# Patient Record
(demographics unavailable — no encounter records)

---

## 2025-01-02 NOTE — HISTORY OF PRESENT ILLNESS
[FreeTextEntry1] : Mr. Aleksander Wood is a 33 year  male w/ hx of HLD (not on any meds). Presents with c/o infertility. He and his partner have tried to conceive for about 1-2 years. He has no issues with erections. He reports no problems with his ejaculatory function. He is able to ejaculate intravaginally.  He reports that he was being followed by Dr. Jaime at [facility] for his infertility and was being managed on Clomid every other day, unclear what dose.  He had a semen analysis performed by his partners BUD specialist and was told he had low counts but he does not recall any specific numbers and did not provide any reports. No hx of anabolic steroid use, chemotherapy, testosterone replacement, prior trauma to genitalia, or chromosomal/genetic mutations. No known family hx of infertility.   His current partner is 31 years old.  She reportedly has no fertility issues and has been cleared by an BUD specialist.   Labs 9/2024 (on Clomid) - He reports that he has since stopped taking Clomid around October.  Total testosterone 491 Estradiol 52 elevated LH 5.1 FSH 11.2

## 2025-01-02 NOTE — PLAN

## 2025-01-02 NOTE — ASSESSMENT
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   Infertility can be due to female or male factor.  Female factors are menopause, early ovarian failure, irregular menses, PCOS, anatomic abnormalities of the vagina and uterus.  With age and especially after the age of 35 there is a higher chance of spontaneous pregnancy abortions and higher rate of chromosomal abnormalities and longer time to get pregnant.   The male factor infertility can be reflected in abnormal semen analysis.  Lack of sperm in the ejaculate on microscopic examination does not mean that sperm is not present in the testicle. In men with azoospermia or (<1-5million)  we perform genetic evaluation including karyotype to exclude genetic reasons for male infertility.  Other than genetic, the reasons for lack of sperm in the ejaculate can be idiopathic, chemotherapy, radiation therapy and medical therapy.  If no correctable reasons for male infertility and azoospermia are found then microsurgical testicular sperm extraction is performed. Not every sperm bank or cryo bank preserves and processes sperm from testicular biopsies.   If sperm is present in the ejaculate typically there may be a correctable reasons for male infertility like low testosterone, elevated prolactin, varicocele, partial ejaculatory duct obstruction and infection in seminal vesicles and prostate. In such situations the work-up consists of scrotal ultrasound if physical examination is not revealing or difficult to perform. Varicose veins of the scrotum have been shown to be frequent cause of secondary and primary infertility.  Repair of varicocele can restore and improve sperm production in majority of men if varicocele is large. Guidelines do not recommend repair of small subclinical varicoceles as current literature shows no demonstrable benefit to semen parameters or pregnancy rates.   Full hormonal evaluation is performed including testosterone, FSH, LH, prolactin, estradiol and others as needed.   I have also discussed with the patient that typically we recommend sometimes repeat semen analysis as the results of semen analysis in a single man can vary dramatically. For ex. if patient had history of febrile disease within 2 to 3 months from semen analysis it is likely that acute stress negatively affected sperm quality.   The potential need for IVF/ICSI was also explained. Need to cryopreserve specimen was explained. Option of using sperm donor and adoption were also reviewed. Considering the wife's age using sperm donor as a backup if sperm is not found during testicular sperm extraction is feasible option.   Adoption is also another option should all medical/surgical interventions fail. Emotional support was provided, and all questions/concerns were addressed.   Patient will follow-up with me to review his results and plan next steps accordingly.

## 2025-01-02 NOTE — PHYSICAL EXAM
[Chaperone Present] : A chaperone was present in the examining room during all aspects of the physical examination [FreeTextEntry2] : Veronica Neff MA

## 2025-01-16 NOTE — PLAN

## 2025-01-16 NOTE — PLAN

## 2025-01-16 NOTE — HISTORY OF PRESENT ILLNESS
[FreeTextEntry1] : Mr. Aleksander Wood is a 33 year male w/ hx of HLD (not on any meds). Presents with c/o infertility. He and his partner have tried to conceive for about 1-2 years. He has no issues with erections. He reports no problems with his ejaculatory function. He is able to ejaculate intravaginally. He reports that he was being followed by Dr. Jaime at [facility] for his infertility and was being managed on Clomid every other day, unclear what dose. He had a semen analysis performed by his partners BUD specialist and was told he had low counts but he does not recall any specific numbers and did not provide any reports. No hx of anabolic steroid use, chemotherapy, testosterone replacement, prior trauma to genitalia, or chromosomal/genetic mutations. No known family hx of infertility.  His current partner is 31 years old. She reportedly has no fertility issues and has been cleared by an BUD specialist.  Labs 9/2024 (on Clomid) - He reports that he has since stopped taking Clomid around October. Total testosterone 491 Estradiol 52 elevated LH 5.1 FSH 11.2  Office visit 01/16/2025 Pt presents today to review labs and symptoms. Pt is currently not on any medications. Denies having any sx of chest pain, SOB, slurred speech, headaches or vision changes. Labs 1/2025 Total Testosterone - 355 SHBG - 28.7 LH - 6.5 FSH - 8.7 Estradiol - 23 Vit D - 25 AST/ALT - 21/46  Semen analysis - Volume 2.5cc, pH 8.5, +fructose, Azoospermia  Scrotal US  1/2025 - Calculated testicular Volume is about 7cc bilaterally RIGHT: Right testis: 3.7 cm x 1.7 cm x 2.3 cm. Normal echogenicity and echotexture with no masses or areas of architectural distortion. Normal arterial and venous blood flow pattern. Right epididymis: Within normal limits. Right hydrocele: Trace. Right varicocele: Supine: Paratesticular vein measures 0.11 cm at baseline and 0.13 cm with Valsalva. Reflux time of less than 1 second. Upright: Paratesticular vein measures 0.15 cm at baseline and 0.19 cm with Valsalva. Reflux time of less than 1 second. LEFT: Left testis: 3.8 cm x 1.5 cm x 2.2 cm. Normal echogenicity and echotexture with no masses or areas of architectural distortion. Normal arterial and venous blood flow pattern. Left epididymis: Within normal limits. Left hydrocele: Trace. Left varicocele: Supine: Paratesticular vein measures 0.12 cm at baseline and 0.15 cm with Valsalva. Less than 1 second reflux time. Upright: Paratesticular vein measures 0.2 cm at baseline and 0.24 cm with Valsalva. Less than 1 second reflux time. IMPRESSION: Bilateral paratesticular vein measurements as described.

## 2025-01-16 NOTE — HISTORY OF PRESENT ILLNESS
[FreeTextEntry1] : Mr. Aleksander Wood is a 33 year male w/ hx of HLD (not on any meds). Presents with c/o infertility. He and his partner have tried to conceive for about 1-2 years. He has no issues with erections. He reports no problems with his ejaculatory function. He is able to ejaculate intravaginally. He reports that he was being followed by Dr. Jaime at [facility] for his infertility and was being managed on Clomid every other day, unclear what dose. He had a semen analysis performed by his partners BUD specialist and was told he had low counts but he does not recall any specific numbers and did not provide any reports. No hx of anabolic steroid use, chemotherapy, testosterone replacement, prior trauma to genitalia, or chromosomal/genetic mutations. No known family hx of infertility.  His current partner is 31 years old. She reportedly has no fertility issues and has been cleared by an BUD specialist.  Labs 9/2024 (on Clomid) - He reports that he has since stopped taking Clomid around October. Total testosterone 491 Estradiol 52 elevated LH 5.1 FSH 11.2  Office visit 01/16/2025 Pt presents today to review labs and symptoms. Pt is currently not on any medications. Denies having any sx of chest pain, SOB, slurred speech, headaches or vision changes. Labs 1/2025 Total Testosterone - 355 SHBG - 28.7 LH - 6.5 FSH - 8.7 Estradiol - 23 Vit D - 25 AST/ALT - 21/46  Semen analysis - Volume 2.5cc, pH 8.5, +fructose, Azoospermia  Scrotal US  1/2025 - Calculated testicular Volume is about 7cc bilaterally RIGHT: Right testis: 3.7 cm x 1.7 cm x 2.3 cm. Normal echogenicity and echotexture with no masses or areas of architectural distortion. Normal arterial and venous blood flow pattern. Right epididymis: Within normal limits. Right hydrocele: Trace. Right varicocele: Supine: Paratesticular vein measures 0.11 cm at baseline and 0.13 cm with Valsalva. Reflux time of less than 1 second. Upright: Paratesticular vein measures 0.15 cm at baseline and 0.19 cm with Valsalva. Reflux time of less than 1 second. LEFT: Left testis: 3.8 cm x 1.5 cm x 2.2 cm. Normal echogenicity and echotexture with no masses or areas of architectural distortion. Normal arterial and venous blood flow pattern. Left epididymis: Within normal limits. Left hydrocele: Trace. Left varicocele: Supine: Paratesticular vein measures 0.12 cm at baseline and 0.15 cm with Valsalva. Less than 1 second reflux time. Upright: Paratesticular vein measures 0.2 cm at baseline and 0.24 cm with Valsalva. Less than 1 second reflux time. IMPRESSION: Bilateral paratesticular vein measurements as described.

## 2025-01-16 NOTE — ASSESSMENT
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 Volume normal at 2.5cc, pH wnl - Hormones reviewed. Significant for low-normal range testosterone with adequate pituitary function.  - Will start on Clomid 25mg QOD, will recheck hormones in 1 month and determine if pt will require additional treatment with an AI.  - repeat SA in 2-3 months post-clomid  - Scrotal US reviewed and interpreted. No significant varicoceles bilaterally. Testicular volume is low at ~7cc bilaterally consistent with testicular atrophy.  - Importance of diet/Behavorial changes noted due to pts co-morbidities, HLD - pt understands that he is unlikely to have any significant benefit from being on SERMs/AI and will likely requrie a microTESE for IVF/ICSI procedure should his repeat SA show no sperm - will perform genetic testing Chromosomal analysis and microY deletion  #Vitamin D insufficiency - start OTC supplementation - check Vit D in 3-4 months, follow up w PCP regarding further work up/management Discussed with patient how vitamin D production plays an role in testosterone production and how its deficiency has been linked to some men with hypogonadism. Regarding pregnancy outcomes, normal level of vitamin D seems to be related to higher pregnancy rates. Nevertheless, future studies are needed to better clarify the exact role of vitamin D on hormonal and seminal panel in both fertile and infertile men. Patient should supplement with 600-2000IU daily, recommended doses vary widely between medical societies and depend on several patient factors. Patient should also increase safe sun exposure, and fortify his diet with fatty fish, fortified dairy products, and fortified cereals.